# Patient Record
Sex: FEMALE
[De-identification: names, ages, dates, MRNs, and addresses within clinical notes are randomized per-mention and may not be internally consistent; named-entity substitution may affect disease eponyms.]

---

## 2018-04-30 ENCOUNTER — HOSPITAL ENCOUNTER (OUTPATIENT)
Dept: HOSPITAL 31 - C.ER | Age: 43
Setting detail: OBSERVATION
LOS: 2 days | Discharge: HOME | End: 2018-05-02
Attending: INTERNAL MEDICINE | Admitting: INTERNAL MEDICINE
Payer: COMMERCIAL

## 2018-04-30 VITALS — RESPIRATION RATE: 20 BRPM

## 2018-04-30 DIAGNOSIS — N12: ICD-10-CM

## 2018-04-30 DIAGNOSIS — E86.0: ICD-10-CM

## 2018-04-30 DIAGNOSIS — N30.90: Primary | ICD-10-CM

## 2018-04-30 DIAGNOSIS — E87.6: ICD-10-CM

## 2018-04-30 LAB
ALBUMIN SERPL-MCNC: 4.4 G/DL (ref 3.5–5)
ALBUMIN/GLOB SERPL: 1 {RATIO} (ref 1–2.1)
ALT SERPL-CCNC: 70 U/L (ref 9–52)
AST SERPL-CCNC: 81 U/L (ref 14–36)
BACTERIA #/AREA URNS HPF: (no result) /[HPF]
BASE EXCESS BLDV CALC-SCNC: -10 MMOL/L (ref 0–2)
BASOPHILS # BLD AUTO: 0.1 K/UL (ref 0–0.2)
BASOPHILS NFR BLD: 0.7 % (ref 0–2)
BILIRUB UR-MCNC: NEGATIVE MG/DL
BUN SERPL-MCNC: 11 MG/DL (ref 7–17)
CALCIUM SERPL-MCNC: 9.4 MG/DL (ref 8.6–10.4)
CAOX CRY #/AREA URNS HPF: (no result) /HPF
EOSINOPHIL # BLD AUTO: 0 K/UL (ref 0–0.7)
EOSINOPHIL NFR BLD: 0.1 % (ref 0–4)
ERYTHROCYTE [DISTWIDTH] IN BLOOD BY AUTOMATED COUNT: 15 % (ref 11.5–14.5)
GFR NON-AFRICAN AMERICAN: > 60
GLUCOSE UR STRIP-MCNC: NORMAL MG/DL
HCG,QUALITATIVE URINE: NEGATIVE
HGB BLD-MCNC: 12.3 G/DL (ref 11–16)
LEUKOCYTE ESTERASE UR-ACNC: (no result) LEU/UL
LYMPHOCYTES # BLD AUTO: 1.8 K/UL (ref 1–4.3)
LYMPHOCYTES NFR BLD AUTO: 10.3 % (ref 20–40)
MCH RBC QN AUTO: 26.7 PG (ref 27–31)
MCHC RBC AUTO-ENTMCNC: 34 G/DL (ref 33–37)
MCV RBC AUTO: 78.5 FL (ref 81–99)
MONOCYTES # BLD: 0.9 K/UL (ref 0–0.8)
MONOCYTES NFR BLD: 4.9 % (ref 0–10)
NEUTROPHILS # BLD: 14.7 K/UL (ref 1.8–7)
NEUTROPHILS NFR BLD AUTO: 84 % (ref 50–75)
NRBC BLD AUTO-RTO: 0 % (ref 0–2)
PCO2 BLDV: 21 MMHG (ref 40–60)
PH BLDV: 7.39 [PH] (ref 7.32–7.43)
PH UR STRIP: 6 [PH] (ref 5–8)
PLATELET # BLD: 398 K/UL (ref 130–400)
PMV BLD AUTO: 8.2 FL (ref 7.2–11.7)
PROT UR STRIP-MCNC: (no result) MG/DL
RBC # BLD AUTO: 4.59 MIL/UL (ref 3.8–5.2)
RBC # UR STRIP: (no result) /UL
SP GR UR STRIP: 1.01 (ref 1–1.03)
SQUAMOUS EPITHIAL: 2 /HPF (ref 0–5)
UROBILINOGEN UR-MCNC: NORMAL MG/DL (ref 0.2–1)
VENOUS BLOOD GAS PO2: 70 MM/HG (ref 30–55)
WBC # BLD AUTO: 17.5 K/UL (ref 4.8–10.8)

## 2018-04-30 PROCEDURE — 96365 THER/PROPH/DIAG IV INF INIT: CPT

## 2018-04-30 PROCEDURE — 80074 ACUTE HEPATITIS PANEL: CPT

## 2018-04-30 PROCEDURE — 36415 COLL VENOUS BLD VENIPUNCTURE: CPT

## 2018-04-30 PROCEDURE — 87040 BLOOD CULTURE FOR BACTERIA: CPT

## 2018-04-30 PROCEDURE — 74176 CT ABD & PELVIS W/O CONTRAST: CPT

## 2018-04-30 PROCEDURE — 80053 COMPREHEN METABOLIC PANEL: CPT

## 2018-04-30 PROCEDURE — 81001 URINALYSIS AUTO W/SCOPE: CPT

## 2018-04-30 PROCEDURE — 85025 COMPLETE CBC W/AUTO DIFF WBC: CPT

## 2018-04-30 PROCEDURE — 99285 EMERGENCY DEPT VISIT HI MDM: CPT

## 2018-04-30 PROCEDURE — 84443 ASSAY THYROID STIM HORMONE: CPT

## 2018-04-30 PROCEDURE — 96360 HYDRATION IV INFUSION INIT: CPT

## 2018-04-30 PROCEDURE — 82607 VITAMIN B-12: CPT

## 2018-04-30 PROCEDURE — 87389 HIV-1 AG W/HIV-1&-2 AB AG IA: CPT

## 2018-04-30 PROCEDURE — 87181 SC STD AGAR DILUTION PER AGT: CPT

## 2018-04-30 PROCEDURE — 80061 LIPID PANEL: CPT

## 2018-04-30 PROCEDURE — 84703 CHORIONIC GONADOTROPIN ASSAY: CPT

## 2018-04-30 PROCEDURE — 82803 BLOOD GASES ANY COMBINATION: CPT

## 2018-04-30 PROCEDURE — 83540 ASSAY OF IRON: CPT

## 2018-04-30 PROCEDURE — 83550 IRON BINDING TEST: CPT

## 2018-04-30 PROCEDURE — 87086 URINE CULTURE/COLONY COUNT: CPT

## 2018-04-30 PROCEDURE — 96374 THER/PROPH/DIAG INJ IV PUSH: CPT

## 2018-04-30 PROCEDURE — 83036 HEMOGLOBIN GLYCOSYLATED A1C: CPT

## 2018-04-30 NOTE — C.PDOC
History Of Present Illness


42 year old female presents to the ED for evaluation of dysuria, bilateral 

flank pain and suprapubic abdominal pain which began 3 days ago. Patient also 

reports fever. Patient denies chest pain, shortness of breath, vomiting, 

diarrhea, constipation, vaginal discharge. 


Time Seen by Provider: 04/30/18 17:31


Chief Complaint (Nursing): Female Genitourinary


History Per: Patient


History/Exam Limitations: no limitations


Onset/Duration Of Symptoms: Days (3)


Current Symptoms Are (Timing): Still Present


Quality Of Discomfort: "Pain"


Associated Symptoms: Fever, Urinary Symptoms (dysuria ).  denies: Nausea, 

Vomiting, Diarrhea, Chest Pain


Additional History Per: Patient


Abnormal Vaginal Bleeding: No





Past Medical History


Reviewed: Historical Data, Nursing Documentation, Vital Signs


Vital Signs: 


 Last Vital Signs











Temp  102.9 F H  04/30/18 18:27


 


Pulse  114 H  04/30/18 18:27


 


Resp  14   04/30/18 18:27


 


BP  126/78   04/30/18 18:27


 


Pulse Ox  100   04/30/18 18:35














- Medical History


PMH: No Chronic Diseases


Surgical History: No Surg Hx


Family History: States: Unknown Family Hx





- Social History


Hx Alcohol Use: No


Hx Substance Use: No





- Immunization History


Hx Tetanus Toxoid Vaccination: No


Hx Influenza Vaccination: No


Hx Pneumococcal Vaccination: No





Review Of Systems


Constitutional: Positive for: Fever


Respiratory: Negative for: Cough, Shortness of Breath


Gastrointestinal: Positive for: Abdominal Pain (suprapubic ).  Negative for: 

Nausea, Vomiting, Diarrhea, Constipation


Genitourinary: Positive for: Dysuria.  Negative for: Vaginal Discharge


Musculoskeletal: Positive for: Other (bilateral flank pain )





Physical Exam





- Physical Exam


Appears: Non-toxic, No Acute Distress


Skin: Normal Color, Warm, Dry


Head: Atraumatic, Normacephalic


Eye(s): bilateral: Normal Inspection


Oral Mucosa: Moist


Neck: Supple


Chest: Symmetrical, No Deformity, No Tenderness


Cardiovascular: Rhythm Regular, No Murmur, Other (tachycardic)


Respiratory: Normal Breath Sounds, No Rales, No Rhonchi, No Wheezing


Gastrointestinal/Abdominal: Soft, Tenderness (suprapubic ), No Guarding, No 

Rebound


Back: Other (b/l flank tenderness )


Extremity: Normal ROM, Capillary Refill (less than 2 seconds )


Neurological/Psych: Oriented x3, Normal Speech, Normal Cognition





ED Course And Treatment





- Laboratory Results


Result Diagrams: 


 04/30/18 17:44





 04/30/18 17:44


O2 Sat by Pulse Oximetry: 100 (on RA)


Pulse Ox Interpretation: Normal


Progress Note: Bloodwork and urinalysis ordered and reviewed.  Tylenol PO and 

IV Fluids administered.  Patient persistently tachycardic and febrile after 1L 

IVF and tylenol and motrin.  WBC significantly elevated.  Presentation 

consistent with pyelonephritis.  No improvement after fluids and due to 

abnormal vitals will need observation.  Spoke to Dr. Huerta who accepted patient 

and requested that I place patient under Dr. Gary





Disposition





- Disposition


Disposition: HOSPITALIZED


Disposition Time: 18:34


Condition: FAIR


Forms:  [facility] (English)





- Clinical Impression


Clinical Impression: 


 Pyelonephritis, Leukocytosis, Tachycardia, SIRS (systemic inflammatory 

response syndrome)








- Scribe Statement


The provider has reviewed the documentation as recorded by the Scribe (Candace Huerta)


Provider Attestation: 








All medical record entries made by the Scribe were at my direction and 

personally dictated by me. I have reviewed the chart and agree that the record 

accurately reflects my personal performance of the history, physical exam, 

medical decision making, and the department course for this patient. I have 

also personally directed, reviewed, and agree with the discharge instructions 

and disposition.

## 2018-04-30 NOTE — CP.PCM.HP
<Jon Harrington - Last Filed: 18 20:13>





History of Present Illness





- History of Present Illness


History of Present Illness: 


CC: dysuria and back pain fevers x 3 days





PMD: None 


Language: Macedonian


FULL CODE 





This patient is a 41yo F w/ no known PMhx who is presenting to the hospital 

after 3 days of back pain, fevers/chills, and nausea. She states she has never 

had this pain before. This pain is 8/10, sharp, stabbing in the b/l mid back, 

associated with dysuria/frequency/urgency. She states she has a son that is 

sick at home, but states that he has a type of cancer that she cannot remember. 

She has mild anorexia, and has not been wanting to eat that much for the past 

few days. She admits to fevers/chills, back pain, dysuria/frequency/urgency. 

She denies headache, chest pain, acid reflux, abdominal pain, lower extremity 

pain/swelling, new rashes. Unrelated she is also complaining of left shoulder 

pain, that she has had for a few months associated with weakness picking up 

heavy objects. She denies tingling in the fingers or neck pain. 





PMhx: denies


Allergies: Denies


Surgeries: Gallbladder removal, 1 c section


Meds: denies


FamHx: Denies


Social: denies EtOH, illicit drug use, lifelong non smoker, independent in all 

IADL and ADL, homemaker 





Present on Admission





- Present on Admission


Any Indicators Present on Admission: Yes


History of DVT/PE: No


History of Uncontrolled Diabetes: No


Urinary Catheter: No


Decubitus Ulcer Present: No





Past Patient History





- Past Social History


Smoking Status: Never Smoked





- PSYCHIATRIC


Hx Substance Use: No





- SURGICAL HISTORY


Hx  Section: Yes


Hx Cholecystectomy: Yes (lap zach)





Meds


Allergies/Adverse Reactions: 


 Allergies











Allergy/AdvReac Type Severity Reaction Status Date / Time


 


No Known Allergies Allergy   Unverified 18 17:28














Physical Exam





- Constitutional


Additional comments: 


patient is diaphoretic, uncomfortable with intact sensorium 





- Head Exam


Head Exam: ATRAUMATIC, NORMAL INSPECTION





- Eye Exam


Eye Exam: EOMI, Normal appearance, PERRL


Pupil Exam: PERRL





- ENT Exam


ENT Exam: Mucous Membranes Moist





- Neck Exam


Neck exam: Positive for: Full Rom, Normal Inspection.  Negative for: 

Lymphadenopathy, Meningismus, Tenderness, Thyromegaly





- Respiratory Exam


Respiratory Exam: Clear to Auscultation Bilateral, NORMAL BREATHING PATTERN.  

absent: Rales, Rhonchi, Wheezes





- Cardiovascular Exam


Cardiovascular Exam: REGULAR RHYTHM, +S1, +S2





- GI/Abdominal Exam


GI & Abdominal Exam: Normal Bowel Sounds, Soft, Tenderness (RUQ).  absent: 

Distended, Firm, Guarding, Hernia, Hyperactive Bowel Sounds, Mass, Rebound, 

Rigid





- Extremities Exam


Extremities exam: Positive for: full ROM, normal inspection.  Negative for: 

calf tenderness, joint swelling, pedal edema, tenderness





- Back Exam


Back exam: CVA tenderness (L), CVA tenderness (R) (right worse than left), 

NORMAL INSPECTION





- Neurological Exam


Neurological exam: Alert, Normal Gait, Oriented x3





- Psychiatric Exam


Psychiatric exam: Normal Affect, Normal Mood





- Skin


Skin Exam: Warm





Results





- Vital Signs


Recent Vital Signs: 





 Last Vital Signs











Temp  99.7 F H  18 19:41


 


Pulse  114 H  18 19:41


 


Resp  18   18 19:41


 


BP  133/80   18 19:41


 


Pulse Ox  98   18 19:41














- Labs


Result Diagrams: 


 18 17:44





 18 17:44


Labs: 





 Laboratory Results - last 24 hr











  18





  17:44 17:44 17:44


 


WBC   17.5 H 


 


RBC   4.59 


 


Hgb   12.3 


 


Hct   36.1 


 


MCV   78.5 L 


 


MCH   26.7 L 


 


MCHC   34.0 


 


RDW   15.0 H 


 


Plt Count   398 


 


MPV   8.2 


 


Neut % (Auto)   84.0 H 


 


Lymph % (Auto)   10.3 L 


 


Mono % (Auto)   4.9 


 


Eos % (Auto)   0.1 


 


Baso % (Auto)   0.7 


 


Neut # (Auto)   14.7 H 


 


Lymph # (Auto)   1.8 


 


Mono # (Auto)   0.9 H 


 


Eos # (Auto)   0.0 


 


Baso # (Auto)   0.1 


 


Sodium    143


 


Potassium    3.2 L


 


Chloride    97 L


 


Carbon Dioxide    29


 


Anion Gap    20


 


BUN    11


 


Creatinine    0.7


 


Est GFR ( Amer)    > 60


 


Est GFR (Non-Af Amer)    > 60


 


Random Glucose    128 H


 


Calcium    9.4


 


Total Bilirubin    0.8


 


AST    81 H


 


ALT    70 H


 


Alkaline Phosphatase    225 H


 


Total Protein    9.1 H


 


Albumin    4.4


 


Globulin    4.7 H


 


Albumin/Globulin Ratio    1.0


 


Urine Color  Yellow  


 


Urine Clarity  Hazy  


 


Urine pH  6.0  


 


Ur Specific Gravity  1.012  


 


Urine Protein  2+ H  


 


Urine Glucose (UA)  Normal  


 


Urine Ketones  Negative  


 


Urine Blood  2+ H  


 


Urine Nitrate  Positive H  


 


Urine Bilirubin  Negative  


 


Urine Urobilinogen  Normal  


 


Ur Leukocyte Esterase  3+ H  


 


Urine WBC (Auto)  95 H  


 


Urine RBC (Auto)  47 H  


 


Ur Squamous Epith Cells  2  


 


Calcium Oxalate Crystal  Rare  


 


Urine Bacteria  Rare  


 


Urine HCG, Qual  Negative  














Assessment & Plan





- Assessment and Plan (Free Text)


Assessment: 


41 yo F admitted for complicated cystitis vs pyelonephritis 





Complicated cystitis vs pyelonephritis


-VBC lac 1.9, patient with temperature, tachycardic, with positive UA


   -f/u UCx


   -f/u blood cultures


-150/hr NS, patient received 2L in ER


-f/u abd/pelvis CT scan to r/o stone; will need urological consult if has stone


-received ceftriaxone in ED; will continue daily  





Hypokalemia


-3.2


-repleted with 40meq


-f/u in AM





Proph


-GI prophylaxis not indicated


-SCD





discussed and seen with Dr. Gary 





Decision To Admit





- Pt Status Changed To:


Hospital Disposition Of: Inpatient





- Admit Certification


Admit to Inpatient:: After my assessment, the patient will require 

hospitalization for at least two midnights.  This is because of the severity of 

symptoms shown, intensity of services needed, and/or the medical risk in this 

patient being treated as an outpatient.





- InPatient:


Physician Admission Certification:: pyelonephritis





- .


Bed Request Type: Regular


Admitting Physician: Osorio Gary





<Osorio Gary - Last Filed: 18 06:20>





Results





- Vital Signs


Recent Vital Signs: 





 Last Vital Signs











Temp  98.5 F   18 00:00


 


Pulse  92 H  18 00:00


 


Resp  20   18 00:00


 


BP  106/68   18 00:00


 


Pulse Ox  100   18 00:00














- Labs


Result Diagrams: 


 18 17:44





 18 17:44


Labs: 





 Laboratory Results - last 24 hr











  18





  17:44 17:44 17:44


 


WBC   17.5 H 


 


RBC   4.59 


 


Hgb   12.3 


 


Hct   36.1 


 


MCV   78.5 L 


 


MCH   26.7 L 


 


MCHC   34.0 


 


RDW   15.0 H 


 


Plt Count   398 


 


MPV   8.2 


 


Neut % (Auto)   84.0 H 


 


Lymph % (Auto)   10.3 L 


 


Mono % (Auto)   4.9 


 


Eos % (Auto)   0.1 


 


Baso % (Auto)   0.7 


 


Neut # (Auto)   14.7 H 


 


Lymph # (Auto)   1.8 


 


Mono # (Auto)   0.9 H 


 


Eos # (Auto)   0.0 


 


Baso # (Auto)   0.1 


 


pO2   


 


VBG pH   


 


VBG pCO2   


 


VBG HCO3   


 


VBG Total CO2   


 


VBG O2 Sat (Calc)   


 


VBG Base Excess   


 


VBG Potassium   


 


Glucose   


 


Lactate   


 


Crit Value Called To   


 


Crit Value Called By   


 


Crit Value Read Back   


 


Blood Gas Notified Time   


 


Sodium    143


 


Potassium    3.2 L


 


Chloride    97 L


 


Carbon Dioxide    29


 


Anion Gap    20


 


BUN    11


 


Creatinine    0.7


 


Est GFR ( Amer)    > 60


 


Est GFR (Non-Af Amer)    > 60


 


Random Glucose    128 H


 


Calcium    9.4


 


Total Bilirubin    0.8


 


AST    81 H


 


ALT    70 H


 


Alkaline Phosphatase    225 H


 


Total Protein    9.1 H


 


Albumin    4.4


 


Globulin    4.7 H


 


Albumin/Globulin Ratio    1.0


 


Venous Blood Potassium   


 


Urine Color  Yellow  


 


Urine Clarity  Hazy  


 


Urine pH  6.0  


 


Ur Specific Gravity  1.012  


 


Urine Protein  2+ H  


 


Urine Glucose (UA)  Normal  


 


Urine Ketones  Negative  


 


Urine Blood  2+ H  


 


Urine Nitrate  Positive H  


 


Urine Bilirubin  Negative  


 


Urine Urobilinogen  Normal  


 


Ur Leukocyte Esterase  3+ H  


 


Urine WBC (Auto)  95 H  


 


Urine RBC (Auto)  47 H  


 


Ur Squamous Epith Cells  2  


 


Calcium Oxalate Crystal  Rare  


 


Urine Bacteria  Rare  


 


Urine HCG, Qual  Negative  














  18





  20:00


 


WBC 


 


RBC 


 


Hgb 


 


Hct 


 


MCV 


 


MCH 


 


MCHC 


 


RDW 


 


Plt Count 


 


MPV 


 


Neut % (Auto) 


 


Lymph % (Auto) 


 


Mono % (Auto) 


 


Eos % (Auto) 


 


Baso % (Auto) 


 


Neut # (Auto) 


 


Lymph # (Auto) 


 


Mono # (Auto) 


 


Eos # (Auto) 


 


Baso # (Auto) 


 


pO2  70 H


 


VBG pH  7.39


 


VBG pCO2  21 L


 


VBG HCO3  17.0


 


VBG Total CO2  13.3 L


 


VBG O2 Sat (Calc)  97.9 H


 


VBG Base Excess  -10.0 L


 


VBG Potassium  1.4 L*


 


Glucose  116 H


 


Lactate  1.9


 


Crit Value Called To  [employer] [facility]


 


Crit Value Called By  Kaitlin


 


Crit Value Read Back  Y


 


Blood Gas Notified Time  


 


Sodium  147.0


 


Potassium 


 


Chloride  118.0 H


 


Carbon Dioxide 


 


Anion Gap 


 


BUN 


 


Creatinine 


 


Est GFR ( Amer) 


 


Est GFR (Non-Af Amer) 


 


Random Glucose 


 


Calcium 


 


Total Bilirubin 


 


AST 


 


ALT 


 


Alkaline Phosphatase 


 


Total Protein 


 


Albumin 


 


Globulin 


 


Albumin/Globulin Ratio 


 


Venous Blood Potassium  1.4 L*


 


Urine Color 


 


Urine Clarity 


 


Urine pH 


 


Ur Specific Gravity 


 


Urine Protein 


 


Urine Glucose (UA) 


 


Urine Ketones 


 


Urine Blood 


 


Urine Nitrate 


 


Urine Bilirubin 


 


Urine Urobilinogen 


 


Ur Leukocyte Esterase 


 


Urine WBC (Auto) 


 


Urine RBC (Auto) 


 


Ur Squamous Epith Cells 


 


Calcium Oxalate Crystal 


 


Urine Bacteria 


 


Urine HCG, Qual 














Attending/Attestation





- Attestation


I have personally seen and examined this patient.: Yes


I have fully participated in the care of the patient.: Yes


I have reviewed all pertinent clinical information: Yes


Notes (Text): 





Assessment


* UTI, cystitis, inflammation of the ureters, mild inflammation of kidneys.


* Hemoconcentration, mild dehydration, received ivf in ER








Plan


* IVF


* IV abx, started on rocephin


* Urine/blood culture.


* GI/DVT prophylaxis


* See orders for detail.

## 2018-04-30 NOTE — CT
EXAM:

  CT Abdomen and Pelvis Without Intravenous Contrast



CLINICAL HISTORY:

  42 years old, female; Condition or disease; Other: Pyelonephritis



TECHNIQUE:

  Axial computed tomography images of the abdomen and pelvis without 

intravenous contrast.  All CT scans at this facility use one or more dose 

reduction techniques, viz.: automated exposure control; ma/kV adjustment per 

patient size (including targeted exams where dose is matched to indication; 

i.e. head); or iterative reconstruction technique.

  Coronal and sagittal reformatted images were created and reviewed.



COMPARISON:

  No relevant prior studies available.



FINDINGS:

  Limitations:  Lack of intravenous contrast.

  Lung bases:  No acute findings.



 ABDOMEN:

  Liver:  Fatty infiltration.

  Gallbladder and bile ducts:  Cholecystectomy.  No significant ductal dilation.

  Pancreas:  Unremarkable.  No ductal dilation.

  Spleen:  Few calcifications.  No splenomegaly.

  Adrenals:  No mass.

  Kidneys and ureters:  Apparent minimal stranding about kidneys, left greater 

than right.  No renal calculi.  No hydronephrosis.  Minimal stranding about 

ureters, left greater than right.

  Stomach and bowel:  No definite mural thickening.  No obstruction.



 PELVIS:

  Appendix:  Normal caliber.  No inflammation.

  Bladder:  Moderate bladder wall thickening.  Mild stranding about bladder.  

No stones.

  Reproductive:  Unremarkable as visualized.



 ABDOMEN and PELVIS:

  Intraperitoneal space:  No significant fluid collection.  No free air.

  Bones/joints:  No acute fracture.

  Soft tissues:  Minimal linear scarring anterior abdominal wall.

  Vasculature:  Unremarkable.  No aneurysm.

  Lymph nodes:  No pathologically enlarged lymph nodes.



IMPRESSION:     

1.  Findings compatible with cystitis/ureteritis/probable early pyelonephritis.

2.  Incidental/non-acute findings are described above.

## 2018-05-01 LAB
% IRON SATURATION: 4 (ref 20–55)
ALBUMIN SERPL-MCNC: 3.2 G/DL (ref 3.5–5)
ALBUMIN/GLOB SERPL: 1 {RATIO} (ref 1–2.1)
ALT SERPL-CCNC: 82 U/L (ref 9–52)
AST SERPL-CCNC: 107 U/L (ref 14–36)
BASOPHILS # BLD AUTO: 0.1 K/UL (ref 0–0.2)
BASOPHILS NFR BLD: 0.6 % (ref 0–2)
BUN SERPL-MCNC: 7 MG/DL (ref 7–17)
CALCIUM SERPL-MCNC: 8 MG/DL (ref 8.6–10.4)
EOSINOPHIL # BLD AUTO: 0 K/UL (ref 0–0.7)
EOSINOPHIL NFR BLD: 0.2 % (ref 0–4)
ERYTHROCYTE [DISTWIDTH] IN BLOOD BY AUTOMATED COUNT: 15 % (ref 11.5–14.5)
GFR NON-AFRICAN AMERICAN: > 60
HDLC SERPL-MCNC: 44 MG/DL (ref 30–70)
HEPATITIS A IGM: NEGATIVE
HEPATITIS B CORE AB: NEGATIVE
HEPATITIS C ANTIBODY: NEGATIVE
HGB BLD-MCNC: 10.2 G/DL (ref 11–16)
IRON SERPL-MCNC: 13 UG/DL (ref 37–170)
LDLC SERPL-MCNC: 87 MG/DL (ref 0–129)
LYMPHOCYTES # BLD AUTO: 1.8 K/UL (ref 1–4.3)
LYMPHOCYTES NFR BLD AUTO: 14 % (ref 20–40)
MCH RBC QN AUTO: 26.5 PG (ref 27–31)
MCHC RBC AUTO-ENTMCNC: 33.4 G/DL (ref 33–37)
MCV RBC AUTO: 79.2 FL (ref 81–99)
MONOCYTES # BLD: 1 K/UL (ref 0–0.8)
MONOCYTES NFR BLD: 7.5 % (ref 0–10)
NEUTROPHILS # BLD: 10.2 K/UL (ref 1.8–7)
NEUTROPHILS NFR BLD AUTO: 77.7 % (ref 50–75)
NRBC BLD AUTO-RTO: 0 % (ref 0–2)
PLATELET # BLD: 305 K/UL (ref 130–400)
PMV BLD AUTO: 8.4 FL (ref 7.2–11.7)
RBC # BLD AUTO: 3.84 MIL/UL (ref 3.8–5.2)
TIBC SERPL-MCNC: 375 UG/DL (ref 250–450)
VIT B12 SERPL-MCNC: 349 PG/ML (ref 239–931)
WBC # BLD AUTO: 13.1 K/UL (ref 4.8–10.8)

## 2018-05-01 RX ADMIN — PANTOPRAZOLE SODIUM SCH MG: 40 TABLET, DELAYED RELEASE ORAL at 09:52

## 2018-05-01 RX ADMIN — Medication SCH MG: at 09:53

## 2018-05-01 NOTE — CP.PCM.PN
<Sharee Schuster - Last Filed: 05/01/18 17:36>





Subjective





- Date & Time of Evaluation


Date of Evaluation: 05/01/18


Time of Evaluation: 17:36





- Subjective


Subjective: 





Patient seen and examined at bedside. Patient resting comfortably in bed with 

no new complaints at this time. Patient says she is not having any pain at the 

moment. She also denies any dysuria today. Patient says she does not feel like 

she has a fever anymore. She denies headache, chest pain, SOB, palpitations, 

abdominal pain, N&V, diarrhea, constipation, and lower extremity pain/swelling. 





Objective





- Vital Signs/Intake and Output


Vital Signs (last 24 hours): 


 











Temp Pulse Resp BP Pulse Ox


 


 98.4 F   80   20   126/77   96 


 


 05/01/18 16:00  05/01/18 16:00  05/01/18 16:00  05/01/18 16:00  05/01/18 16:00








Intake and Output: 


 











 05/01/18 05/01/18





 06:59 18:59


 


Intake Total  3100


 


Balance  3100














- Medications


Medications: 


 Current Medications





Acetaminophen (Tylenol 325mg Tab)  650 mg PO Q6 PRN


   PRN Reason: Fever >100.4 F


   Last Admin: 05/01/18 08:57 Dose:  650 mg


Ceftriaxone Sodium 1 gm/ (Sodium Chloride)  100 mls @ 100 mls/hr IVPB DAILY [facility]


   PRN Reason: Protocol


   Last Admin: 05/01/18 09:53 Dose:  100 mls/hr


Sodium Chloride (Sodium Chloride 0.9%)  1,000 mls @ 150 mls/hr IV .Q6H40M [facility]


   Last Admin: 05/01/18 08:56 Dose:  150 mls/hr


Ketorolac Tromethamine (Toradol)  30 mg IV Q6 PRN


   PRN Reason: Pain, severe (8-10)


Ketorolac Tromethamine (Toradol)  15 mg IVP Q6 PRN


   PRN Reason: Pain, moderate (4-7)


Ondansetron HCl (Zofran Inj)  4 mg IVP Q6H PRN


   PRN Reason: Nausea/Vomiting


   Last Admin: 04/30/18 21:40 Dose:  4 mg


Pantoprazole Sodium (Protonix Ec Tab)  40 mg PO DAILY [facility]


   Last Admin: 05/01/18 09:52 Dose:  40 mg


Pneumococcal Polyvalent Vaccine (Pneumovax 23 Vaccine)  0.5 ml IM .ONCE ONE


   Stop: 05/02/18 10:01


Saccharomyces Boulardii (Florastor)  250 mg PO DAILY [facility]


   Last Admin: 05/01/18 09:53 Dose:  250 mg











- Labs


Labs: 


 





 05/01/18 06:47 





 05/01/18 06:47 











- Constitutional


Appears: Non-toxic, No Acute Distress





- Head Exam


Head Exam: ATRAUMATIC, NORMAL INSPECTION, NORMOCEPHALIC





- Eye Exam


Eye Exam: EOMI, Normal appearance, PERRL





- ENT Exam


ENT Exam: Mucous Membranes Moist





- Neck Exam


Neck Exam: Normal Inspection.  absent: Tenderness





- Respiratory Exam


Respiratory Exam: Clear to Ausculation Bilateral, NORMAL BREATHING PATTERN.  

absent: Rales, Rhonchi, Wheezes





- Cardiovascular Exam


Cardiovascular Exam: RRR, +S1, +S2.  absent: Bradycardia, Tachycardia





- GI/Abdominal Exam


GI & Abdominal Exam: Soft, Normal Bowel Sounds.  absent: Distended, Tenderness





- Extremities Exam


Extremities Exam: Normal Inspection.  absent: Calf Tenderness, Pedal Edema





- Back Exam


Back Exam: CVA tenderness (L), CVA tenderness (R).  absent: rash noted, 

vertebral tenderness





- Neurological Exam


Neurological Exam: Alert, Awake, Oriented x3





- Psychiatric Exam


Psychiatric exam: Normal Affect, Normal Mood





- Skin


Skin Exam: Dry, Intact, Normal Color, Warm





Assessment and Plan





- Assessment and Plan (Free Text)


Plan: 





Pyelonephritis 


* Afebrile since last fever in the ED (4/30)


* White count trending down


* Urine culture: gram negative fabian; final pending


* Blood cultures: pending 


* CT abdomen/pelvis: Cystitis, ureteritis, and possible early pyelonephritis 


* NS @150 cc/h 


* Rocephin 1 g IV QD 


* Florastor 250 mg PO QD 


* Toradol PRN pain 


* Tylenol PRN fever 








Prophylaxis 


* GI prophylaxis not indicated


* SCD





<Koko Huerta - Last Filed: 05/01/18 20:29>





Objective





- Vital Signs/Intake and Output


Vital Signs (last 24 hours): 


 











Temp Pulse Resp BP Pulse Ox


 


 98.4 F   80   20   126/77   96 


 


 05/01/18 16:00  05/01/18 16:00  05/01/18 16:00  05/01/18 16:00  05/01/18 16:00








Intake and Output: 


 











 05/01/18 05/02/18





 18:59 06:59


 


Intake Total 3100 


 


Balance 3100 














- Medications


Medications: 


 Current Medications





Acetaminophen (Tylenol 325mg Tab)  650 mg PO Q6 PRN


   PRN Reason: Fever >100.4 F


   Last Admin: 05/01/18 08:57 Dose:  650 mg


Ceftriaxone Sodium 1 gm/ (Sodium Chloride)  100 mls @ 100 mls/hr IVPB DAILY [facility]


   PRN Reason: Protocol


   Last Admin: 05/01/18 09:53 Dose:  100 mls/hr


Sodium Chloride (Sodium Chloride 0.9%)  1,000 mls @ 150 mls/hr IV .Q6H40M [facility]


   Last Admin: 05/01/18 16:00 Dose:  150 mls/hr


Ketorolac Tromethamine (Toradol)  30 mg IV Q6 PRN


   PRN Reason: Pain, severe (8-10)


Ketorolac Tromethamine (Toradol)  15 mg IVP Q6 PRN


   PRN Reason: Pain, moderate (4-7)


Ondansetron HCl (Zofran Inj)  4 mg IVP Q6H PRN


   PRN Reason: Nausea/Vomiting


   Last Admin: 04/30/18 21:40 Dose:  4 mg


Pantoprazole Sodium (Protonix Ec Tab)  40 mg PO DAILY [facility]


   Last Admin: 05/01/18 09:52 Dose:  40 mg


Pneumococcal Polyvalent Vaccine (Pneumovax 23 Vaccine)  0.5 ml IM .ONCE ONE


   Stop: 05/02/18 10:01


Saccharomyces Boulardii (Florastor)  250 mg PO DAILY [facility]


   Last Admin: 05/01/18 09:53 Dose:  250 mg











- Labs


Labs: 


 





 05/01/18 06:47 





 05/01/18 06:47 











Attending/Attestation





- Attestation


I have personally seen and examined this patient.: Yes


I have fully participated in the care of the patient.: Yes


I have reviewed all pertinent clinical information, including history, physical 

exam and plan: Yes


Notes (Text): 





05/01/18 20:24


Patient was seen and examined at 8:45 AM.





Also on ROS:


Mild nausea with small amount of vomiting clear material this morning


Back pain present but improved


Dysuria has decreased


Urinating a lot because of IVF





Also on Exam:


Bilateral CVA tenderness





Assessments:





Bilateral Pyelonephritis: f/u urine culture. Ceftriaxone


Hypokalemia: resolved 


Abnormal RBC indices: f/u iron panel


Elevated LFTs: f/u hepatitis panel and HIV. Secondary to Ceftriaxone?





Koko Huerta D.O.

## 2018-05-02 VITALS
HEART RATE: 83 BPM | OXYGEN SATURATION: 99 % | SYSTOLIC BLOOD PRESSURE: 140 MMHG | TEMPERATURE: 98.9 F | DIASTOLIC BLOOD PRESSURE: 94 MMHG

## 2018-05-02 LAB
ALBUMIN SERPL-MCNC: 3.5 G/DL (ref 3.5–5)
ALBUMIN/GLOB SERPL: 1 {RATIO} (ref 1–2.1)
ALT SERPL-CCNC: 112 U/L (ref 9–52)
AST SERPL-CCNC: 109 U/L (ref 14–36)
BASOPHILS # BLD AUTO: 0.1 K/UL (ref 0–0.2)
BASOPHILS NFR BLD: 1 % (ref 0–2)
BUN SERPL-MCNC: 3 MG/DL (ref 7–17)
CALCIUM SERPL-MCNC: 8.5 MG/DL (ref 8.6–10.4)
EOSINOPHIL # BLD AUTO: 0 K/UL (ref 0–0.7)
EOSINOPHIL NFR BLD: 0.5 % (ref 0–4)
ERYTHROCYTE [DISTWIDTH] IN BLOOD BY AUTOMATED COUNT: 14.8 % (ref 11.5–14.5)
GFR NON-AFRICAN AMERICAN: > 60
HGB BLD-MCNC: 10.7 G/DL (ref 11–16)
LYMPHOCYTES # BLD AUTO: 1.5 K/UL (ref 1–4.3)
LYMPHOCYTES NFR BLD AUTO: 17.6 % (ref 20–40)
MCH RBC QN AUTO: 27.8 PG (ref 27–31)
MCHC RBC AUTO-ENTMCNC: 35.2 G/DL (ref 33–37)
MCV RBC AUTO: 79.1 FL (ref 81–99)
MONOCYTES # BLD: 0.9 K/UL (ref 0–0.8)
MONOCYTES NFR BLD: 10.9 % (ref 0–10)
NEUTROPHILS # BLD: 5.8 K/UL (ref 1.8–7)
NEUTROPHILS NFR BLD AUTO: 70 % (ref 50–75)
NRBC BLD AUTO-RTO: 0 % (ref 0–2)
PLATELET # BLD: 279 K/UL (ref 130–400)
PMV BLD AUTO: 8.2 FL (ref 7.2–11.7)
RBC # BLD AUTO: 3.84 MIL/UL (ref 3.8–5.2)
WBC # BLD AUTO: 8.3 K/UL (ref 4.8–10.8)

## 2018-05-02 RX ADMIN — PANTOPRAZOLE SODIUM SCH MG: 40 TABLET, DELAYED RELEASE ORAL at 09:52

## 2018-05-02 RX ADMIN — Medication SCH MG: at 09:53

## 2018-05-02 NOTE — CP.PCM.DIS
<Sharee Schuster - Last Filed: 05/02/18 19:12>





Provider





- Provider


Date of Admission: 


04/30/18 18:28





Attending physician: 


Osorio Gary MD





Time Spent in preparation of Discharge (in minutes): 35





Diagnosis





- Discharge Diagnosis


(1) Pyelonephritis


Status: Acute   





Hospital Course





- Lab Results


Lab Results: 


 Micro Results





04/30/18 18:00   Urine,Clean Catch   Urine Culture - Final


                            Escherichia Coli


04/30/18 18:10   Blood   Blood Culture - Preliminary


                            NO GROWTH AFTER 24 HOURS


04/30/18 17:40   Blood   Blood Culture - Preliminary


                            NO GROWTH AFTER 24 HOURS





 Most Recent Lab Values











WBC  8.3 K/uL (4.8-10.8)   05/02/18  07:14    


 


RBC  3.84 Mil/uL (3.80-5.20)   05/02/18  07:14    


 


Hgb  10.7 g/dL (11.0-16.0)  L  05/02/18  07:14    


 


Hct  30.4 % (34.0-47.0)  L  05/02/18  07:14    


 


MCV  79.1 fL (81.0-99.0)  L  05/02/18  07:14    


 


MCH  27.8 pg (27.0-31.0)   05/02/18  07:14    


 


MCHC  35.2 g/dL (33.0-37.0)   05/02/18  07:14    


 


RDW  14.8 % (11.5-14.5)  H  05/02/18  07:14    


 


Plt Count  279 K/uL (130-400)   05/02/18  07:14    


 


MPV  8.2 fL (7.2-11.7)   05/02/18  07:14    


 


Neut % (Auto)  70.0 % (50.0-75.0)   05/02/18  07:14    


 


Lymph % (Auto)  17.6 % (20.0-40.0)  L  05/02/18  07:14    


 


Mono % (Auto)  10.9 % (0.0-10.0)  H  05/02/18  07:14    


 


Eos % (Auto)  0.5 % (0.0-4.0)   05/02/18  07:14    


 


Baso % (Auto)  1.0 % (0.0-2.0)   05/02/18  07:14    


 


Neut # (Auto)  5.8 K/uL (1.8-7.0)   05/02/18  07:14    


 


Lymph # (Auto)  1.5 K/uL (1.0-4.3)   05/02/18  07:14    


 


Mono # (Auto)  0.9 K/uL (0.0-0.8)  H  05/02/18  07:14    


 


Eos # (Auto)  0.0 K/uL (0.0-0.7)   05/02/18  07:14    


 


Baso # (Auto)  0.1 K/uL (0.0-0.2)   05/02/18  07:14    


 


pO2  70 mm/Hg (30-55)  H  04/30/18  20:00    


 


VBG pH  7.39  (7.32-7.43)   04/30/18  20:00    


 


VBG pCO2  21 mmHg (40-60)  L  04/30/18  20:00    


 


VBG HCO3  17.0 mmol/L  04/30/18  20:00    


 


VBG Total CO2  13.3 mmol/L (22-28)  L  04/30/18  20:00    


 


VBG O2 Sat (Calc)  97.9 % (40-65)  H  04/30/18  20:00    


 


VBG Base Excess  -10.0 mmol/L (0.0-2.0)  L  04/30/18  20:00    


 


VBG Potassium  1.4 mmol/L (3.6-5.2)  L*  04/30/18  20:00    


 


Sodium  147.0 mmol/l (132-148)   04/30/18  20:00    


 


Chloride  118.0 mmol/L ()  H  04/30/18  20:00    


 


Glucose  116 mg/dl ()  H  04/30/18  20:00    


 


Lactate  1.9 mmol/L (0.7-2.1)   04/30/18  20:00    


 


Crit Value Called To  Yeni lawrence   04/30/18  20:00    


 


Crit Value Called By  Kaitlin   04/30/18  20:00    


 


Crit Value Read Back  Y   04/30/18  20:00    


 


Blood Gas Notified Time  2004 04/30/18  20:00    


 


Sodium  142 mmol/L (132-148)   05/02/18  07:14    


 


Potassium  3.8 mmol/L (3.6-5.2)   05/02/18  07:14    


 


Chloride  105 mmol/L ()   05/02/18  07:14    


 


Carbon Dioxide  25 mmol/L (22-30)   05/02/18  07:14    


 


Anion Gap  16  (10-20)   05/02/18  07:14    


 


BUN  3 mg/dL (7-17)  L  05/02/18  07:14    


 


Creatinine  0.6 mg/dL (0.7-1.2)  L  05/02/18  07:14    


 


Est GFR ( Amer)  > 60   05/02/18  07:14    


 


Est GFR (Non-Af Amer)  > 60   05/02/18  07:14    


 


Random Glucose  120 mg/dL ()  H  05/02/18  07:14    


 


Hemoglobin A1c  6.4 % (4.2-6.5)   05/01/18  06:47    


 


Calcium  8.5 mg/dl (8.6-10.4)  L  05/02/18  07:14    


 


Iron  13 ug/dL ()  L  05/01/18  06:47    


 


TIBC  375 ug/dL (250-450)   05/01/18  06:47    


 


% Saturation  4  (20-55)  L  05/01/18  06:47    


 


Total Bilirubin  0.6 mg/dL (0.2-1.3)   05/02/18  07:14    


 


AST  109 U/L (14-36)  H  05/02/18  07:14    


 


ALT  112 U/L (9-52)  H D 05/02/18  07:14    


 


Alkaline Phosphatase  211 U/L ()  H D 05/02/18  07:14    


 


Total Protein  7.0 g/dL (6.3-8.3)   05/02/18  07:14    


 


Albumin  3.5 g/dL (3.5-5.0)   05/02/18  07:14    


 


Globulin  3.5 gm/dL (2.2-3.9)   05/02/18  07:14    


 


Albumin/Globulin Ratio  1.0  (1.0-2.1)   05/02/18  07:14    


 


Triglycerides  99 mg/dL (0-149)   05/01/18  06:47    


 


Cholesterol  153 mg/dL (0-199)   05/01/18  06:47    


 


LDL Cholesterol Direct  87 mg/dL (0-129)   05/01/18  06:47    


 


HDL Cholesterol  44 mg/dL (30-70)   05/01/18  06:47    


 


Vitamin B12  349 pg/mL (239-931)   05/01/18  06:47    


 


TSH 3rd Generation  2.01 mIU/L (0.46-4.68)   05/01/18  06:47    


 


Venous Blood Potassium  1.4 mmol/L (3.6-5.2)  L*  04/30/18  20:00    


 


Urine Color  Yellow  (YELLOW)   04/30/18  17:44    


 


Urine Clarity  Hazy  (Clear)   04/30/18  17:44    


 


Urine pH  6.0  (5.0-8.0)   04/30/18  17:44    


 


Ur Specific Gravity  1.012  (1.003-1.030)   04/30/18  17:44    


 


Urine Protein  2+ mg/dL (NEGATIVE)  H  04/30/18  17:44    


 


Urine Glucose (UA)  Normal mg/dL (Normal)   04/30/18  17:44    


 


Urine Ketones  Negative mg/dL (NEGATIVE)   04/30/18  17:44    


 


Urine Blood  2+  (NEGATIVE)  H  04/30/18  17:44    


 


Urine Nitrate  Positive  (NEGATIVE)  H  04/30/18  17:44    


 


Urine Bilirubin  Negative  (NEGATIVE)   04/30/18  17:44    


 


Urine Urobilinogen  Normal mg/dL (0.2-1.0)   04/30/18  17:44    


 


Ur Leukocyte Esterase  3+ Tamika/uL (Negative)  H  04/30/18  17:44    


 


Urine WBC (Auto)  95 /hpf (0-5)  H  04/30/18  17:44    


 


Urine RBC (Auto)  47 /hpf (0-3)  H  04/30/18  17:44    


 


Ur Squamous Epith Cells  2 /hpf (0-5)   04/30/18  17:44    


 


Calcium Oxalate Crystal  Rare /hpf (<OCC)   04/30/18  17:44    


 


Urine Bacteria  Rare  (<OCC)   04/30/18  17:44    


 


Urine HCG, Qual  Negative  (NEGATIVE)   04/30/18  17:44    


 


Hepatitis A IgM Ab  Negative  (NEGATIVE)   05/01/18  06:47    


 


Hep Bs Antigen  Negative  (NEGATIVE)   05/01/18  06:47    


 


Hep B Core IgM Ab  Negative  (NEGATIVE)   05/01/18  06:47    


 


Hepatitis C Antibody  Negative  (NEGATIVE)   05/01/18  06:47    


 


HIV 1&2 Ag/Ab, 4th Gen  Nonreactive  (Nonreactive)   05/01/18  11:32    














- Hospital Course


Hospital Course: 





Upon admission: 


This patient is a 41yo F w/ no known PMhx who is presenting to the hospital 

after 3 days of back pain, fevers/chills, and nausea. She states she has never 

had this pain before. This pain is 8/10, sharp, stabbing in the b/l mid back, 

associated with dysuria/frequency/urgency. She states she has a son that is 

sick at home, but states that he has a type of cancer that she cannot remember. 

She has mild anorexia, and has not been wanting to eat that much for the past 

few days. She admits to fevers/chills, back pain, dysuria/frequency/urgency. 

She denies headache, chest pain, acid reflux, abdominal pain, lower extremity 

pain/swelling, new rashes. Unrelated she is also complaining of left shoulder 

pain, that she has had for a few months associated with weakness picking up 

heavy objects. She denies tingling in the fingers or neck pain. 





Hospital Course: 


Patient was admitted for pyelonephritis. Patient was started on the following: 

NS @150 cc/h, Rocephin 1 g IV QD, Florastor 250 mg PO QD, Toradol PRN pain, and 

Tylenol PRN fever. CT abdomen/pelvis showed cystitis, ureteritis, and possible 

early pyelonephritis. Blood cultures were ordered and were negative. Urine 

cultures ordered and showed E. Coli sensitive to Cipro. Patient was 

asymptomatic today so she was cleared for discharge by Dr. Huerta. She was given 

the following discharge instructions: 





Please follow up within 7-10 days with the [facility] on the 

basement level of [facility] ([phone]) or at [facility] 

location ([phone]), whichever is more convenient for you, for coordination 

of  your care as well as bloodwork to follow up on your liver function  tests. 


Please take the following medications:


   Ciprofloxacin 500 mg twice daily, once at breakfast and once at dinner time


      *Please be aware that this medication has been known to cause tendonitis 

or, in the worst case, tendon rupture. Please refrain from exercise or 


      lifting any heavy objects while taking this medication. If you experience 

pain,swelling, inflammation, or rupture of a tendon, please discontinue 


      this medication and go to your nearest ER.   


   Probiotic (ask your pharmacist for their recommendation) - take once at 

lunchtime for 37 days 


   Ferrous Sulfate 325 mg once daily at breakfast time 


      *Please be aware that this medication can make your stool appear black - 

do not be alarmed as this is normal


Please drink a lot of fluids, aiming for 3 liters of water daily. If you 

experience any new or worsening symptoms please return to your nearest ER. 








Please note that this is a summary of events. For more details, please see 

complete medical record. 





Discharge Exam





- Head Exam


Head Exam: ATRAUMATIC, NORMAL INSPECTION, NORMOCEPHALIC





- Eye Exam


Eye Exam: EOMI, Normal appearance, PERRL





- ENT Exam


ENT Exam: Mucous Membranes Moist





- Neck Exam


Neck exam: Normal Inspection





- Respiratory Exam


Respiratory Exam: Clear to PA & Lateral, NORMAL BREATHING PATTERN, UNREMARKABLE





- Cardiovascular Exam


Cardiovascular Exam: RRR, +S1, +S2.  absent: Bradycardia, Tachycardia, 

Diastolic murmur, Gallop, Rubs, Systolic Murmur





- GI/Abdominal Exam


GI & Abdominal Exam: Normal Bowel Sounds, Soft, Unremarkable.  absent: 

Tenderness





- Extremities Exam


Extremities exam: normal inspection





- Back Exam


Back exam: NORMAL INSPECTION.  absent: CVA tenderness (L), CVA tenderness (R)





- Neurological Exam


Neurological exam: Alert, Oriented x3





- Psychiatric Exam


Psychiatric exam: Normal Affect, Normal Mood





- Skin


Skin Exam: Dry, Intact, Normal Color, Warm





Discharge Plan





- Discharge Medications


Prescriptions: 


Ciprofloxacin HCl [Cipro] 500 mg PO BID #14 tablet


Ferrous Sulfate 325 mg PO DAILY #30 tablet





- Follow Up Plan


Condition: GOOD


Disposition: HOME/ ROUTINE


Instructions:  Ciprofloxacin (Systemic), Ferrous Sulfate, Tachycardia (DC), 

Urinary Tract Infection in Women (DC), Urinary Tract Infection in Men (DC), 

Leukocytosis (DC), Dysuria (GEN)


Additional Instructions: 


Please follow up within 7-10 days with the [facility] on the 

basement level of [facility] ([phone]) or at [facility] 

location ([phone]), whichever is more convenient for you, for coordination 

of  your care as well as bloodwork to follow up on your liver function  tests. 


Please take the following medications:


   Ciprofloxacin 500 mg twice daily, once at breakfast and once at dinner time


      *Please be aware that this medication has been known to cause tendonitis 

or, in the worst case, tendon rupture. Please refrain from exercise or 


      lifting any heavy objects while taking this medication. If you experience 

pain,swelling, inflammation, or rupture of a tendon, please discontinue 


      this medication and go to your nearest ER.   


   Probiotic (ask your pharmacist for their recommendation) - take once at 

lunchtime for 37 days 


   Ferrous Sulfate 325 mg once daily at breakfast time 


      *Please be aware that this medication can make your stool appear black - 

do not be alarmed as this is normal


Please drink a lot of fluids, aiming for 3 liters of water daily. If you 

experience any new or worsening symptoms please return to your nearest ER. 


Referrals: 


[facility] at [facility] [Outside]





<Koko Huerta - Last Filed: 05/02/18 19:54>





Provider





- Provider


Date of Admission: 


04/30/18 18:28





Attending physician: 


Osorio Gary MD





Time Spent in preparation of Discharge (in minutes): 40





Hospital Course





- Lab Results


Lab Results: 


 Micro Results





04/30/18 18:00   Urine,Clean Catch   Urine Culture - Final


                            Escherichia Coli


04/30/18 18:10   Blood   Blood Culture - Preliminary


                            NO GROWTH AFTER 24 HOURS


04/30/18 17:40   Blood   Blood Culture - Preliminary


                            NO GROWTH AFTER 24 HOURS





 Most Recent Lab Values











WBC  8.3 K/uL (4.8-10.8)   05/02/18  07:14    


 


RBC  3.84 Mil/uL (3.80-5.20)   05/02/18  07:14    


 


Hgb  10.7 g/dL (11.0-16.0)  L  05/02/18  07:14    


 


Hct  30.4 % (34.0-47.0)  L  05/02/18  07:14    


 


MCV  79.1 fL (81.0-99.0)  L  05/02/18  07:14    


 


MCH  27.8 pg (27.0-31.0)   05/02/18  07:14    


 


MCHC  35.2 g/dL (33.0-37.0)   05/02/18  07:14    


 


RDW  14.8 % (11.5-14.5)  H  05/02/18  07:14    


 


Plt Count  279 K/uL (130-400)   05/02/18  07:14    


 


MPV  8.2 fL (7.2-11.7)   05/02/18  07:14    


 


Neut % (Auto)  70.0 % (50.0-75.0)   05/02/18  07:14    


 


Lymph % (Auto)  17.6 % (20.0-40.0)  L  05/02/18  07:14    


 


Mono % (Auto)  10.9 % (0.0-10.0)  H  05/02/18  07:14    


 


Eos % (Auto)  0.5 % (0.0-4.0)   05/02/18  07:14    


 


Baso % (Auto)  1.0 % (0.0-2.0)   05/02/18  07:14    


 


Neut # (Auto)  5.8 K/uL (1.8-7.0)   05/02/18  07:14    


 


Lymph # (Auto)  1.5 K/uL (1.0-4.3)   05/02/18  07:14    


 


Mono # (Auto)  0.9 K/uL (0.0-0.8)  H  05/02/18  07:14    


 


Eos # (Auto)  0.0 K/uL (0.0-0.7)   05/02/18  07:14    


 


Baso # (Auto)  0.1 K/uL (0.0-0.2)   05/02/18  07:14    


 


pO2  70 mm/Hg (30-55)  H  04/30/18  20:00    


 


VBG pH  7.39  (7.32-7.43)   04/30/18  20:00    


 


VBG pCO2  21 mmHg (40-60)  L  04/30/18  20:00    


 


VBG HCO3  17.0 mmol/L  04/30/18  20:00    


 


VBG Total CO2  13.3 mmol/L (22-28)  L  04/30/18  20:00    


 


VBG O2 Sat (Calc)  97.9 % (40-65)  H  04/30/18  20:00    


 


VBG Base Excess  -10.0 mmol/L (0.0-2.0)  L  04/30/18  20:00    


 


VBG Potassium  1.4 mmol/L (3.6-5.2)  L*  04/30/18  20:00    


 


Sodium  147.0 mmol/l (132-148)   04/30/18  20:00    


 


Chloride  118.0 mmol/L ()  H  04/30/18  20:00    


 


Glucose  116 mg/dl ()  H  04/30/18  20:00    


 


Lactate  1.9 mmol/L (0.7-2.1)   04/30/18  20:00    


 


Crit Value Called To  Yeni villa er   04/30/18  20:00    


 


Crit Value Called By  Kaitlin   04/30/18  20:00    


 


Crit Value Read Back  Y   04/30/18  20:00    


 


Blood Gas Notified Time  2004 04/30/18  20:00    


 


Sodium  142 mmol/L (132-148)   05/02/18  07:14    


 


Potassium  3.8 mmol/L (3.6-5.2)   05/02/18  07:14    


 


Chloride  105 mmol/L ()   05/02/18  07:14    


 


Carbon Dioxide  25 mmol/L (22-30)   05/02/18  07:14    


 


Anion Gap  16  (10-20)   05/02/18  07:14    


 


BUN  3 mg/dL (7-17)  L  05/02/18  07:14    


 


Creatinine  0.6 mg/dL (0.7-1.2)  L  05/02/18  07:14    


 


Est GFR ( Amer)  > 60   05/02/18  07:14    


 


Est GFR (Non-Af Amer)  > 60   05/02/18  07:14    


 


Random Glucose  120 mg/dL ()  H  05/02/18  07:14    


 


Hemoglobin A1c  6.4 % (4.2-6.5)   05/01/18  06:47    


 


Calcium  8.5 mg/dl (8.6-10.4)  L  05/02/18  07:14    


 


Iron  13 ug/dL ()  L  05/01/18  06:47    


 


TIBC  375 ug/dL (250-450)   05/01/18  06:47    


 


% Saturation  4  (20-55)  L  05/01/18  06:47    


 


Total Bilirubin  0.6 mg/dL (0.2-1.3)   05/02/18  07:14    


 


AST  109 U/L (14-36)  H  05/02/18  07:14    


 


ALT  112 U/L (9-52)  H D 05/02/18  07:14    


 


Alkaline Phosphatase  211 U/L ()  H D 05/02/18  07:14    


 


Total Protein  7.0 g/dL (6.3-8.3)   05/02/18  07:14    


 


Albumin  3.5 g/dL (3.5-5.0)   05/02/18  07:14    


 


Globulin  3.5 gm/dL (2.2-3.9)   05/02/18  07:14    


 


Albumin/Globulin Ratio  1.0  (1.0-2.1)   05/02/18  07:14    


 


Triglycerides  99 mg/dL (0-149)   05/01/18  06:47    


 


Cholesterol  153 mg/dL (0-199)   05/01/18  06:47    


 


LDL Cholesterol Direct  87 mg/dL (0-129)   05/01/18  06:47    


 


HDL Cholesterol  44 mg/dL (30-70)   05/01/18  06:47    


 


Vitamin B12  349 pg/mL (239-931)   05/01/18  06:47    


 


TSH 3rd Generation  2.01 mIU/L (0.46-4.68)   05/01/18  06:47    


 


Venous Blood Potassium  1.4 mmol/L (3.6-5.2)  L*  04/30/18  20:00    


 


Urine Color  Yellow  (YELLOW)   04/30/18  17:44    


 


Urine Clarity  Hazy  (Clear)   04/30/18  17:44    


 


Urine pH  6.0  (5.0-8.0)   04/30/18  17:44    


 


Ur Specific Gravity  1.012  (1.003-1.030)   04/30/18  17:44    


 


Urine Protein  2+ mg/dL (NEGATIVE)  H  04/30/18  17:44    


 


Urine Glucose (UA)  Normal mg/dL (Normal)   04/30/18  17:44    


 


Urine Ketones  Negative mg/dL (NEGATIVE)   04/30/18  17:44    


 


Urine Blood  2+  (NEGATIVE)  H  04/30/18  17:44    


 


Urine Nitrate  Positive  (NEGATIVE)  H  04/30/18  17:44    


 


Urine Bilirubin  Negative  (NEGATIVE)   04/30/18  17:44    


 


Urine Urobilinogen  Normal mg/dL (0.2-1.0)   04/30/18  17:44    


 


Ur Leukocyte Esterase  3+ Tamika/uL (Negative)  H  04/30/18  17:44    


 


Urine WBC (Auto)  95 /hpf (0-5)  H  04/30/18  17:44    


 


Urine RBC (Auto)  47 /hpf (0-3)  H  04/30/18  17:44    


 


Ur Squamous Epith Cells  2 /hpf (0-5)   04/30/18  17:44    


 


Calcium Oxalate Crystal  Rare /hpf (<OCC)   04/30/18  17:44    


 


Urine Bacteria  Rare  (<OCC)   04/30/18  17:44    


 


Urine HCG, Qual  Negative  (NEGATIVE)   04/30/18  17:44    


 


Hepatitis A IgM Ab  Negative  (NEGATIVE)   05/01/18  06:47    


 


Hep Bs Antigen  Negative  (NEGATIVE)   05/01/18  06:47    


 


Hep B Core IgM Ab  Negative  (NEGATIVE)   05/01/18  06:47    


 


Hepatitis C Antibody  Negative  (NEGATIVE)   05/01/18  06:47    


 


HIV 1&2 Ag/Ab, 4th Gen  Nonreactive  (Nonreactive)   05/01/18  11:32    














Attending/Attestation





- Attestation


I have personally seen and examined this patient.: Yes


I have fully participated in the care of the patient.: Yes


I have reviewed all pertinent clinical information, including history, physical 

exam and plan: Yes


Notes (Text): 





05/02/18 19:46


Patient was seen and examined with Dr. ERLIN Schuster





Exam, assessment and plan and discharge instructions were gone over with the 

resident.





Koko Huerta D.O.